# Patient Record
Sex: FEMALE | Race: WHITE
[De-identification: names, ages, dates, MRNs, and addresses within clinical notes are randomized per-mention and may not be internally consistent; named-entity substitution may affect disease eponyms.]

---

## 2021-01-04 ENCOUNTER — HOSPITAL ENCOUNTER (OUTPATIENT)
Dept: HOSPITAL 63 - DXRAD | Age: 51
End: 2021-01-04
Attending: FAMILY MEDICINE
Payer: COMMERCIAL

## 2021-01-04 DIAGNOSIS — M17.11: ICD-10-CM
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
## 2021-01-04 DIAGNOSIS — S89.91XA: Primary | ICD-10-CM
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
## 2021-01-04 PROCEDURE — 73562 X-RAY EXAM OF KNEE 3: CPT

## 2021-01-04 NOTE — RAD
EXAM: Right knee, 3 views.



HISTORY: Pain.



COMPARISON: None.



FINDINGS: 3 views of the right knee are obtained. There is no fracture, dislocation or subluxation. T
here is minimal medial and patellofemoral compartment spurring. There is no joint effusion.



IMPRESSION: Minimal right knee osteoarthritis.



Electronically signed by: Chelsie Piper MD (1/4/2021 2:02 PM) [id number]